# Patient Record
Sex: FEMALE | Employment: FULL TIME | URBAN - METROPOLITAN AREA
[De-identification: names, ages, dates, MRNs, and addresses within clinical notes are randomized per-mention and may not be internally consistent; named-entity substitution may affect disease eponyms.]

---

## 2023-03-07 ENCOUNTER — CLINICAL SUPPORT (OUTPATIENT)
Dept: OTHER | Facility: CLINIC | Age: 28
End: 2023-03-07

## 2023-03-07 DIAGNOSIS — Z00.8 ENCOUNTER FOR OTHER GENERAL EXAMINATION: ICD-10-CM

## 2023-03-08 VITALS
BODY MASS INDEX: 51.38 KG/M2 | DIASTOLIC BLOOD PRESSURE: 85 MMHG | HEIGHT: 63 IN | WEIGHT: 290 LBS | SYSTOLIC BLOOD PRESSURE: 138 MMHG

## 2023-03-08 LAB
HDLC SERPL-MCNC: 51 MG/DL
POC CHOLESTEROL, LDL (DOCK): 64 MG/DL
POC CHOLESTEROL, TOTAL: 130 MG/DL
POC GLUCOSE, FASTING: 94 MG/DL (ref 60–110)
TRIGL SERPL-MCNC: 75 MG/DL

## 2024-12-25 ENCOUNTER — HOSPITAL ENCOUNTER (EMERGENCY)
Facility: HOSPITAL | Age: 29
Discharge: HOME | End: 2024-12-25
Attending: EMERGENCY MEDICINE
Payer: MEDICAID

## 2024-12-25 VITALS
OXYGEN SATURATION: 98 % | HEIGHT: 63 IN | HEART RATE: 75 BPM | BODY MASS INDEX: 50.5 KG/M2 | SYSTOLIC BLOOD PRESSURE: 138 MMHG | RESPIRATION RATE: 18 BRPM | WEIGHT: 285 LBS | DIASTOLIC BLOOD PRESSURE: 80 MMHG | TEMPERATURE: 98 F

## 2024-12-25 DIAGNOSIS — R05.9 COUGH: ICD-10-CM

## 2024-12-25 DIAGNOSIS — S50.01XA CONTUSION OF RIGHT ELBOW, INITIAL ENCOUNTER: ICD-10-CM

## 2024-12-25 DIAGNOSIS — R68.83 CHILLS: ICD-10-CM

## 2024-12-25 DIAGNOSIS — M25.521 RIGHT ELBOW PAIN: ICD-10-CM

## 2024-12-25 DIAGNOSIS — J20.9 ACUTE BRONCHITIS, UNSPECIFIED ORGANISM: Primary | ICD-10-CM

## 2024-12-25 DIAGNOSIS — R05.8 PRODUCTIVE COUGH: ICD-10-CM

## 2024-12-25 DIAGNOSIS — J02.9 PHARYNGITIS, UNSPECIFIED ETIOLOGY: ICD-10-CM

## 2024-12-25 LAB
B-HCG UR QL: NEGATIVE
CTP QC/QA: YES
POC MOLECULAR INFLUENZA A AGN: NEGATIVE
POC MOLECULAR INFLUENZA B AGN: NEGATIVE
SARS-COV-2 RDRP RESP QL NAA+PROBE: NEGATIVE

## 2024-12-25 PROCEDURE — 94761 N-INVAS EAR/PLS OXIMETRY MLT: CPT

## 2024-12-25 PROCEDURE — 87635 SARS-COV-2 COVID-19 AMP PRB: CPT | Performed by: EMERGENCY MEDICINE

## 2024-12-25 PROCEDURE — 81025 URINE PREGNANCY TEST: CPT | Performed by: EMERGENCY MEDICINE

## 2024-12-25 PROCEDURE — 63600175 PHARM REV CODE 636 W HCPCS: Performed by: EMERGENCY MEDICINE

## 2024-12-25 PROCEDURE — 99284 EMERGENCY DEPT VISIT MOD MDM: CPT | Mod: 25

## 2024-12-25 PROCEDURE — 94640 AIRWAY INHALATION TREATMENT: CPT

## 2024-12-25 PROCEDURE — 25000242 PHARM REV CODE 250 ALT 637 W/ HCPCS: Performed by: EMERGENCY MEDICINE

## 2024-12-25 PROCEDURE — 87502 INFLUENZA DNA AMP PROBE: CPT

## 2024-12-25 PROCEDURE — 96372 THER/PROPH/DIAG INJ SC/IM: CPT | Performed by: EMERGENCY MEDICINE

## 2024-12-25 RX ORDER — AZITHROMYCIN 250 MG/1
TABLET, FILM COATED ORAL
Qty: 6 TABLET | Refills: 0 | Status: SHIPPED | OUTPATIENT
Start: 2024-12-25 | End: 2024-12-30

## 2024-12-25 RX ORDER — ALBUTEROL SULFATE 90 UG/1
2 INHALANT RESPIRATORY (INHALATION) EVERY 4 HOURS PRN
Qty: 18 G | Refills: 1 | Status: SHIPPED | OUTPATIENT
Start: 2024-12-25 | End: 2025-12-25

## 2024-12-25 RX ORDER — KETOROLAC TROMETHAMINE 30 MG/ML
60 INJECTION, SOLUTION INTRAMUSCULAR; INTRAVENOUS
Status: COMPLETED | OUTPATIENT
Start: 2024-12-25 | End: 2024-12-25

## 2024-12-25 RX ORDER — CETIRIZINE HYDROCHLORIDE 10 MG/1
10 TABLET ORAL DAILY
Qty: 30 TABLET | Refills: 0 | Status: SHIPPED | OUTPATIENT
Start: 2024-12-25 | End: 2025-12-25

## 2024-12-25 RX ORDER — PROMETHAZINE HYDROCHLORIDE AND DEXTROMETHORPHAN HYDROBROMIDE 6.25; 15 MG/5ML; MG/5ML
5 SYRUP ORAL EVERY 4 HOURS PRN
Qty: 118 ML | Refills: 0 | Status: SHIPPED | OUTPATIENT
Start: 2024-12-25 | End: 2025-01-04

## 2024-12-25 RX ORDER — BENZONATATE 200 MG/1
200 CAPSULE ORAL 3 TIMES DAILY PRN
Qty: 15 CAPSULE | Refills: 0 | Status: SHIPPED | OUTPATIENT
Start: 2024-12-25 | End: 2025-01-04

## 2024-12-25 RX ORDER — LIDOCAINE HYDROCHLORIDE 20 MG/ML
SOLUTION OROPHARYNGEAL
Qty: 100 ML | Refills: 0 | Status: SHIPPED | OUTPATIENT
Start: 2024-12-25

## 2024-12-25 RX ORDER — IPRATROPIUM BROMIDE AND ALBUTEROL SULFATE 2.5; .5 MG/3ML; MG/3ML
3 SOLUTION RESPIRATORY (INHALATION)
Status: COMPLETED | OUTPATIENT
Start: 2024-12-25 | End: 2024-12-25

## 2024-12-25 RX ADMIN — KETOROLAC TROMETHAMINE 60 MG: 30 INJECTION, SOLUTION INTRAMUSCULAR; INTRAVENOUS at 05:12

## 2024-12-25 RX ADMIN — IPRATROPIUM BROMIDE AND ALBUTEROL SULFATE 3 ML: 2.5; .5 SOLUTION RESPIRATORY (INHALATION) at 04:12

## 2024-12-25 NOTE — ED PROVIDER NOTES
Encounter Date: 12/25/2024       History     Chief Complaint   Patient presents with    Cough     X over a week with on and off chills, cough is productive and today has streaked blood in it     29-year-old female with asthma and obesity presents with cough associated with chills.  Patient states she has had a cough for the last month.  However, she reports worsening over the last 5 days.  She reports her cough is productive of yellow-green mucus.  Today she coughed up scant blood.  Patient also reports sore throat for the last 4 or 5 days and chills for the last 4-5 days.  No fevers.  No sweats.  No nausea or vomiting.  Patient works at an [employer] and states everyone there is sick.  She has tried using her albuterol metered-dose inhaler for cough without relief.    Patient also reports right elbow pain for the last 4 days.  She thinks that she hit her elbow on her desk at work.  She has some pain to her right elbow dorsal aspect.  No pain on ranging.  No redness.  No restricted range of motion.  Patient has been taking ibuprofen 800 mg without relief.      Review of patient's allergies indicates:  No Known Allergies  Past Medical History:   Diagnosis Date    Asthma     Obesity      History reviewed. No pertinent surgical history.  Family History   Problem Relation Name Age of Onset    Hypertension Mother      Diabetes Neg Hx       Social History     Tobacco Use    Smoking status: Never    Smokeless tobacco: Never   Substance Use Topics    Alcohol use: Yes     Comment: Occasional    Drug use: No     Review of Systems   Constitutional:  Positive for chills. Negative for diaphoresis and fever.   HENT:  Positive for sore throat.    Respiratory:  Positive for cough. Negative for shortness of breath.    Cardiovascular:  Negative for chest pain.   Musculoskeletal:  Positive for arthralgias (R elbow).       Physical Exam     Initial Vitals [12/25/24 0240]   BP Pulse Resp Temp SpO2   (!) 168/87 67 18 98.5 °F (36.9 °C)  100 %      MAP       --         Physical Exam    Nursing note and vitals reviewed.  Constitutional: She appears well-developed and well-nourished. She is not diaphoretic.   Awake, alert, nontoxic, speaking in complete sentences without shortness of breath.     HENT:   Head: Normocephalic and atraumatic. Mouth/Throat: Oropharynx is clear and moist.   Eyes: Conjunctivae and EOM are normal. Pupils are equal, round, and reactive to light.   Neck: Neck supple.   Normal range of motion.  Cardiovascular:  Normal rate, regular rhythm, normal heart sounds and intact distal pulses.           No murmur heard.  Pulmonary/Chest: No respiratory distress. She has wheezes (rare with cough). She has no rhonchi. She has no rales.   Abdominal: Abdomen is soft. There is no abdominal tenderness. There is no rebound and no guarding.   Musculoskeletal:         General: Tenderness present. No edema. Normal range of motion.      Cervical back: Normal range of motion and neck supple.      Comments: Minimal dorsal R elbow TTP. No erythema. No pain on pronation/supination. FAROM.     Neurological: She is alert and oriented to person, place, and time.   Moving all extremities.   Skin: Skin is warm and dry. No erythema. No pallor.   Psychiatric: She has a normal mood and affect.         ED Course   Procedures  Labs Reviewed   POCT URINE PREGNANCY       Result Value    POC Preg Test, Ur Negative       Acceptable Yes     SARS-COV-2 RDRP GENE    POC Rapid COVID Negative       Acceptable Yes     POCT INFLUENZA A/B MOLECULAR    POC Molecular Influenza A Ag Negative      POC Molecular Influenza B Ag Negative       Acceptable Yes            Imaging Results              X-Ray Chest PA And Lateral (Final result)  Result time 12/25/24 04:25:13      Final result by Fay Mills MD (12/25/24 04:25:13)                   Impression:      No radiographic evidence of acute intra-thoracic  process.      Electronically signed by: Fay Mills MD  Date:    12/25/2024  Time:    04:25               Narrative:    EXAMINATION:  XR CHEST PA AND LATERAL    CLINICAL HISTORY:  Cough, unspecified    TECHNIQUE:  PA and lateral views of the chest were performed.    COMPARISON:  12/22/2013    FINDINGS:  The cardiomediastinal silhouette is within normal limits. Mediastinal structures are midline.  The lungs appear symmetrically aerated without definite focal alveolar consolidation. No large pleural effusion or pneumothorax is appreciated.Visualized osseous structures appear intact.  Incidentally noted bilateral nipple piercings present.                                       Medications   ketorolac injection 60 mg (60 mg Intramuscular Given 12/25/24 4962)   albuterol-ipratropium 2.5 mg-0.5 mg/3 mL nebulizer solution 3 mL (3 mLs Nebulization Given 12/25/24 3771)     Medical Decision Making  29-year-old female with productive cough, sore throat, chills, ongoing for 4-5 days, as well as right elbow pain for 4 days.    Differential includes viral URI such as influenza or COVID-19, pneumonia, contusion, sprain, other.    UPT negative.      Flu and COVID-19 negative.      Chest x-ray without acute process.    Patient tx'ed with IM Toradol 60mg and Duoneb.    I suspect viral URI but I have rx'ed zpack which I have told patient not to start unless symptoms worsen.  I have advised use of APAP and ibuprofen PRN body pain / elbow pain.  I have refilled albuterol DMI.  I have rx'ed promethazine-DM and tessalon PRN cough.  I have advised patient that tessalon is dangerous to children.  I have rx'ed PRN viscous lidocaine for sore throat.  I have rx'ed daily Zyrtec.    D/c'ed.  Work note provided.      Amount and/or Complexity of Data Reviewed  Labs: ordered.  Radiology: ordered.    Risk  OTC drugs.  Prescription drug management.                                      Clinical Impression:  Final diagnoses:  [R05.9] Cough  [J20.9]  Acute bronchitis, unspecified organism (Primary)  [R05.8] Productive cough  [J02.9] Pharyngitis, unspecified etiology  [R68.83] Chills  [S50.01XA] Contusion of right elbow, initial encounter  [M25.521] Right elbow pain          ED Disposition Condition    Discharge Stable          ED Prescriptions       Medication Sig Dispense Start Date End Date Auth. Provider    azithromycin (Z-FELECIA) 250 MG tablet Take 2 tablets by mouth on day 1; Take 1 tablet by mouth on days 2-5 6 tablet 12/25/2024 12/30/2024 Gloria Goode MD    albuterol (PROVENTIL/VENTOLIN HFA) 90 mcg/actuation inhaler Inhale 2 puffs into the lungs every 4 (four) hours as needed for Wheezing or Shortness of Breath. Rescue 18 g 12/25/2024 12/25/2025 Gloria Goode MD    promethazine-dextromethorphan (PROMETHAZINE-DM) 6.25-15 mg/5 mL Syrp Take 5 mLs by mouth every 4 (four) hours as needed (cough). 118 mL 12/25/2024 1/4/2025 Gloria Goode MD    benzonatate (TESSALON) 200 MG capsule Take 1 capsule (200 mg total) by mouth 3 (three) times daily as needed for Cough. 15 capsule 12/25/2024 1/4/2025 Gloria Goode MD    LIDOcaine viscous HCl 2% (LIDOCAINE VISCOUS) 2 % Soln by Mucous Membrane route every 3 (three) hours. Gargle and swallow 5mL every 3 hours as needed for sore throat. 100 mL 12/25/2024 -- Gloria Goode MD    cetirizine (ZYRTEC) 10 MG tablet Take 1 tablet (10 mg total) by mouth once daily. 30 tablet 12/25/2024 12/25/2025 Gloria Goode MD          Follow-up Information       Follow up With Specialties Details Why Contact Info    José Houser Jr., MD Hospitalist, Family Medicine  As needed [address] [facility] [id number] [phone]               Gloria Goode MD  12/25/24 [id number]

## 2024-12-25 NOTE — Clinical Note
"Zahra MISHRA" Juan was seen and treated in our emergency department on 12/25/2024.  She may return to work on 01/01/2025.  Patient may return to work sooner if she feels able.     If you have any questions or concerns, please don't hesitate to call.      Gloria Goode MD"